# Patient Record
Sex: MALE | Race: WHITE | NOT HISPANIC OR LATINO | Employment: OTHER | URBAN - NONMETROPOLITAN AREA
[De-identification: names, ages, dates, MRNs, and addresses within clinical notes are randomized per-mention and may not be internally consistent; named-entity substitution may affect disease eponyms.]

---

## 2017-01-09 DIAGNOSIS — R73.9 HYPERGLYCEMIA: Primary | ICD-10-CM

## 2017-01-10 ENCOUNTER — TELEPHONE (OUTPATIENT)
Dept: INTERNAL MEDICINE | Facility: CLINIC | Age: 77
End: 2017-01-10

## 2017-01-10 NOTE — TELEPHONE ENCOUNTER
SPOKE WITH PT LET HIM KNOW LAB SHOWS NOT DIABETIC BUT IN PREDIABETIC RANGE DR MAYORGA RECOMMENDS HE CUT CARBS IN DIET

## 2017-04-23 ENCOUNTER — APPOINTMENT (OUTPATIENT)
Dept: GENERAL RADIOLOGY | Facility: HOSPITAL | Age: 77
End: 2017-04-23

## 2017-04-23 ENCOUNTER — HOSPITAL ENCOUNTER (EMERGENCY)
Facility: HOSPITAL | Age: 77
Discharge: HOME | End: 2017-04-23
Attending: EMERGENCY MEDICINE | Admitting: EMERGENCY MEDICINE

## 2017-04-23 VITALS
WEIGHT: 240 LBS | DIASTOLIC BLOOD PRESSURE: 71 MMHG | OXYGEN SATURATION: 92 % | HEIGHT: 70 IN | TEMPERATURE: 97.7 F | SYSTOLIC BLOOD PRESSURE: 133 MMHG | HEART RATE: 68 BPM | BODY MASS INDEX: 34.36 KG/M2 | RESPIRATION RATE: 18 BRPM

## 2017-04-23 DIAGNOSIS — S61.219A FINGER LACERATION, INITIAL ENCOUNTER: ICD-10-CM

## 2017-04-23 DIAGNOSIS — S62.631B OPEN DISPLACED FRACTURE OF DISTAL PHALANX OF LEFT INDEX FINGER, INITIAL ENCOUNTER: Primary | ICD-10-CM

## 2017-04-23 PROCEDURE — 90471 IMMUNIZATION ADMIN: CPT | Performed by: EMERGENCY MEDICINE

## 2017-04-23 PROCEDURE — 73130 X-RAY EXAM OF HAND: CPT

## 2017-04-23 PROCEDURE — 90715 TDAP VACCINE 7 YRS/> IM: CPT | Performed by: EMERGENCY MEDICINE

## 2017-04-23 PROCEDURE — 99283 EMERGENCY DEPT VISIT LOW MDM: CPT

## 2017-04-23 PROCEDURE — 25010000002 TDAP 5-2.5-18.5 LF-MCG/0.5 SUSPENSION: Performed by: EMERGENCY MEDICINE

## 2017-04-23 RX ORDER — OXYCODONE AND ACETAMINOPHEN 7.5; 325 MG/1; MG/1
1 TABLET ORAL ONCE
Status: COMPLETED | OUTPATIENT
Start: 2017-04-23 | End: 2017-04-23

## 2017-04-23 RX ORDER — CEPHALEXIN 500 MG/1
500 CAPSULE ORAL ONCE
Status: COMPLETED | OUTPATIENT
Start: 2017-04-23 | End: 2017-04-23

## 2017-04-23 RX ORDER — HYDROCODONE BITARTRATE AND ACETAMINOPHEN 5; 325 MG/1; MG/1
1 TABLET ORAL EVERY 6 HOURS PRN
Qty: 10 TABLET | Refills: 0 | Status: SHIPPED | OUTPATIENT
Start: 2017-04-23 | End: 2017-08-14

## 2017-04-23 RX ORDER — CEPHALEXIN 500 MG/1
500 CAPSULE ORAL 4 TIMES DAILY
Qty: 28 CAPSULE | Refills: 0 | Status: SHIPPED | OUTPATIENT
Start: 2017-04-23 | End: 2017-04-30

## 2017-04-23 RX ORDER — DIAPER,BRIEF,INFANT-TODD,DISP
EACH MISCELLANEOUS ONCE
Status: COMPLETED | OUTPATIENT
Start: 2017-04-23 | End: 2017-04-23

## 2017-04-23 RX ADMIN — CEPHALEXIN 500 MG: 500 CAPSULE ORAL at 18:00

## 2017-04-23 RX ADMIN — OXYCODONE HYDROCHLORIDE AND ACETAMINOPHEN 1 TABLET: 7.5; 325 TABLET ORAL at 15:50

## 2017-04-23 RX ADMIN — BACITRACIN ZINC: 500 OINTMENT TOPICAL at 18:00

## 2017-04-23 RX ADMIN — TETANUS TOXOID, REDUCED DIPHTHERIA TOXOID AND ACELLULAR PERTUSSIS VACCINE, ADSORBED 0.5 ML: 5; 2.5; 8; 8; 2.5 SUSPENSION INTRAMUSCULAR at 15:51

## 2017-04-23 NOTE — ED PROVIDER NOTES
Subjective   Patient is a 76 y.o. male presenting with upper extremity pain.   History provided by:  Patient  Upper Extremity Issue   Location:  Finger  Finger location:  L index finger  Injury: yes    Time since incident:  2 hours  Mechanism of injury comment:  While cutting fluids on a table saw a piece of f hit against the left index finger nail area causing a laceration.  Pain details:     Quality:  Sharp    Radiates to:  Does not radiate    Severity:  Moderate    Onset quality:  Sudden    Timing:  Constant    Progression:  Unchanged  Handedness:  Left-handed  Dislocation: no    Tetanus status:  Unknown  Prior injury to area:  No  Relieved by:  Nothing  Worsened by:  Nothing  Associated symptoms: no fever, no swelling and no tingling        Review of Systems   Constitutional: Negative for fever.   HENT: Negative for congestion and facial swelling.    Eyes: Negative for photophobia and visual disturbance.   Respiratory: Negative for cough, shortness of breath and wheezing.    Cardiovascular: Negative for chest pain.   Gastrointestinal: Negative for abdominal pain, diarrhea, nausea and vomiting.   Endocrine: Negative for polyuria.   Genitourinary: Negative for flank pain.   Musculoskeletal: Negative for joint swelling.   Skin: Negative for rash.   Neurological: Negative for seizures and headaches.   Hematological: Negative for adenopathy.   Psychiatric/Behavioral: Negative for agitation.       Past Medical History:   Diagnosis Date   • Backache    • Benign prostatic hyperplasia    • Chest pain    • Chronic low back pain    • Diverticular disease of colon    • History of colon polyps    • Hyperglycemia    • Hyperlipidemia    • Multiple joint pain    • Obesity        No Known Allergies    Past Surgical History:   Procedure Laterality Date   • APPENDECTOMY  05/31/1974    Acute appendicitis   • BACK SURGERY      Low back disk surgery (1)    • ENDOSCOPY AND COLONOSCOPY  09/10/2013    Diverticulosis in sigmoid colon. 1  polyp in colon, 50 cm from entry; removed by snare cautery polypectomy. 1 polyp in cecum; removed by cold biopsy polypectomy. Hemorrhoids found.   • EXCISION LESION  08/09/2012    Destruction of Benign Lesion (1-14) 29524 (1)     • INJECTION OF MEDICATION  05/17/2012    Kenalog (1)     • OTHER SURGICAL HISTORY  01/31/2003    Revision of penis (1) : Phalloplasty.Phimosis   • TONSILLECTOMY      age 26       Family History   Problem Relation Age of Onset   • Heart disease Mother    • Coronary artery disease Other    • Osteoarthritis Other        Social History     Social History   • Marital status: Single     Spouse name: N/A   • Number of children: N/A   • Years of education: N/A     Social History Main Topics   • Smoking status: Former Smoker   • Smokeless tobacco: None      Comment: Patient quit smoking years ago; he smoked for up to 2 packs a day for many years   • Alcohol use Yes      Comment: SOCIAL   • Drug use: No   • Sexual activity: Defer     Other Topics Concern   • None     Social History Narrative           Objective   Physical Exam   Constitutional: He is oriented to person, place, and time. He appears well-developed and well-nourished.   HENT:   Head: Normocephalic and atraumatic.   Eyes: EOM are normal.   Neck: Normal range of motion. Neck supple.   Cardiovascular: Normal rate, regular rhythm and normal heart sounds.    Pulmonary/Chest: Effort normal and breath sounds normal. No respiratory distress. He has no wheezes.   Musculoskeletal: He exhibits tenderness and deformity.   Left index finger laceration from middle nail lateral aspect of the finger.  No exposed bone.  Flexion/extension at DIP intact but painful.   Neurological: He is alert and oriented to person, place, and time.   Nursing note and vitals reviewed.      Laceration Repair  Date/Time: 4/23/2017 5:22 PM  Performed by: HAYDEE REESE  Authorized by: HAYDEE REESE   Consent: Verbal consent obtained.  Risks and benefits: risks, benefits and  "alternatives were discussed  Consent given by: patient  Patient understanding: patient states understanding of the procedure being performed  Patient consent: the patient's understanding of the procedure matches consent given  Procedure consent: procedure consent matches procedure scheduled  Relevant documents: relevant documents present and verified  Test results: test results available and properly labeled  Site marked: the operative site was marked  Imaging studies: imaging studies available  Required items: required blood products, implants, devices, and special equipment available  Patient identity confirmed: verbally with patient  Time out: Immediately prior to procedure a \"time out\" was called to verify the correct patient, procedure, equipment, support staff and site/side marked as required.  Body area: upper extremity  Location details: left index finger  Laceration length: 2.5 cm  Foreign bodies: unknown  Tendon involvement: none  Vascular damage: no  Anesthesia: digital block    Anesthesia:  Anesthesia: digital block  Local Anesthetic: lidocaine 1% without epinephrine   Anesthetic total: 5 mL  Sedation:  Patient sedated: no    Preparation: Patient was prepped and draped in the usual sterile fashion.  Irrigation solution: saline  Irrigation method: jet lavage  Amount of cleaning: extensive  Debridement: none  Degree of undermining: none  Skin closure: 4-0 nylon  Number of sutures: 5  Technique: simple  Approximation: close  Approximation difficulty: complex  Dressing: antibiotic ointment  Patient tolerance: Patient tolerated the procedure well with no immediate complications               ED Course  ED Course   Comment By Time   Laceration is repaired.  Tetanus is updated.  Advised follow-up with Dr. Sue/Dr. Huff.  Continue with Keflex. Oj Alonso MD 04/23 4338              Labs Reviewed - No data to display    Xr Hand 3+ View Left    Result Date: 4/23/2017  Narrative: EXAM:  Radiograph(s), Osseous "     REGION:   Hand SIDE:     Left VIEWS:   3      INDICATION:    Table saw injury to the index finger  COMPARISON:    none   FINDINGS:   There is traumatic fracture of the distal tuft of the index finger with associated soft tissue injury. The remainder the examination is unremarkable.     Impression: CONCLUSION: 1. Traumatic fracture and soft tissue injury associated with the distal aspect of the index finger.         Electronically signed by:  MIGUE Chew MD  4/23/2017 4:42 PM CDT Workstation: [id number]          [facility]    Final diagnoses:   Open displaced fracture of distal phalanx of left index finger, initial encounter   Finger laceration, initial encounter            Oj Alonso MD  04/24/17 [id number]

## 2017-04-23 NOTE — ED NOTES
Pt presents to er with c/o finger laceration from using a table saw. Pt reports that his tetanus is not up to date     Charo Mckeon RN  04/23/17 [id number]

## 2017-04-24 ENCOUNTER — OFFICE VISIT (OUTPATIENT)
Dept: ORTHOPEDIC SURGERY | Facility: CLINIC | Age: 77
End: 2017-04-24

## 2017-04-24 VITALS — HEIGHT: 70 IN | WEIGHT: 243 LBS | BODY MASS INDEX: 34.79 KG/M2

## 2017-04-24 DIAGNOSIS — M79.642 LEFT HAND PAIN: ICD-10-CM

## 2017-04-24 DIAGNOSIS — S62.609B: ICD-10-CM

## 2017-04-24 DIAGNOSIS — S61.211A LACERATION OF LEFT INDEX FINGER: Primary | ICD-10-CM

## 2017-04-24 PROCEDURE — 99202 OFFICE O/P NEW SF 15 MIN: CPT | Performed by: ORTHOPAEDIC SURGERY

## 2017-04-24 NOTE — PROGRESS NOTES
Timothy Garcia is a 76 y.o. male   Primary provider:  Cris Arreola MD       Chief Complaint   Patient presents with   • Left Hand - Pain       HISTORY OF PRESENT ILLNESS:    HPI Comments: cutting wood on a table saw a piece flew off and hit against the left index finger nail area causing a laceration. Patient was seen in the ER on 4/23/2017. Laceration repair done in the er.     Pain   This is a new problem. The current episode started yesterday. The problem occurs constantly. The problem has been unchanged. Associated symptoms include neck pain. Associated symptoms comments: Dull ache. Nothing aggravates the symptoms. He has tried oral narcotics for the symptoms.      Basically this is a fingertip injury wounds have been closed no neurovascular deficits  CONCURRENT MEDICAL HISTORY:    Past Medical History:   Diagnosis Date   • Backache    • Benign prostatic hyperplasia    • Chest pain    • Chronic low back pain    • Diverticular disease of colon    • History of colon polyps    • Hyperglycemia    • Hyperlipidemia    • Multiple joint pain    • Obesity        No Known Allergies      Current Outpatient Prescriptions:   •  albuterol (PROVENTIL HFA;VENTOLIN HFA) 108 (90 BASE) MCG/ACT inhaler, Inhale 2 puffs Every 4 (Four) Hours As Needed for wheezing., Disp: 1 inhaler, Rfl: 1  •  aspirin 81 MG chewable tablet, Chew 81 mg Daily., Disp: , Rfl:   •  atorvastatin (LIPITOR) 10 MG tablet, TAKE ONE TABLET EACH DAY., Disp: 90 tablet, Rfl: 3  •  cephalexin (KEFLEX) 500 MG capsule, Take 1 capsule by mouth 4 (Four) Times a Day for 7 days., Disp: 28 capsule, Rfl: 0  •  doxycycline (DORYX) 100 MG enteric coated tablet, Take 1 tablet by mouth 2 (Two) Times a Day., Disp: 14 tablet, Rfl: 0  •  esomeprazole (nexIUM) 20 MG capsule, TAKE ONE CAPSULE BY MOUTH DAILY, Disp: 90 capsule, Rfl: 0  •  HYDROcodone-acetaminophen (NORCO) 5-325 MG per tablet, Take 1 tablet by mouth Every 6 (Six) Hours As Needed for Moderate Pain (4-6) for up to  15 doses., Disp: 10 tablet, Rfl: 0  •  HYDROcodone-acetaminophen (NORCO) 7.5-325 MG per tablet, Take 1 tablet by mouth 4 (Four) Times a Day., Disp: , Rfl:   •  tamsulosin (FLOMAX) 0.4 MG capsule 24 hr capsule, TAKE 1 CAPSULE DAILY., Disp: 90 capsule, Rfl: 3  •  MethylPREDNISolone (MEDROL, KAPIL,) 4 MG tablet, Take as directed on package instructions., Disp: 21 tablet, Rfl: 0  •  nabumetone (RELAFEN) 750 MG tablet, Take 1 tablet by mouth 2 (Two) Times a Day As Needed for mild pain (1-3)., Disp: 30 tablet, Rfl: 1  No current facility-administered medications for this visit.     Past Surgical History:   Procedure Laterality Date   • APPENDECTOMY  05/31/1974    Acute appendicitis   • BACK SURGERY      Low back disk surgery (1)    • ENDOSCOPY AND COLONOSCOPY  09/10/2013    Diverticulosis in sigmoid colon. 1 polyp in colon, 50 cm from entry; removed by snare cautery polypectomy. 1 polyp in cecum; removed by cold biopsy polypectomy. Hemorrhoids found.   • EXCISION LESION  08/09/2012    Destruction of Benign Lesion (1-14) 53119 (1)     • INJECTION OF MEDICATION  05/17/2012    Kenalog (1)     • OTHER SURGICAL HISTORY  01/31/2003    Revision of penis (1) : Phalloplasty.Phimosis   • TONSILLECTOMY      age 26       Family History   Problem Relation Age of Onset   • Heart disease Mother    • Coronary artery disease Other    • Osteoarthritis Other         Social History     Social History   • Marital status: Single     Spouse name: N/A   • Number of children: N/A   • Years of education: N/A     Occupational History   • Not on file.     Social History Main Topics   • Smoking status: Former Smoker   • Smokeless tobacco: Not on file      Comment: Patient quit smoking years ago; he smoked for up to 2 packs a day for many years   • Alcohol use Yes      Comment: SOCIAL   • Drug use: No   • Sexual activity: Defer     Other Topics Concern   • Not on file     Social History Narrative        Review of Systems   Respiratory: Positive for  "shortness of breath and wheezing.    Musculoskeletal: Positive for back pain and neck pain.   Psychiatric/Behavioral: Positive for sleep disturbance.   All other systems reviewed and are negative.      PHYSICAL EXAMINATION:       Ht 70\" (177.8 cm)  Wt 243 lb (110 kg)  BMI 34.87 kg/m2    Physical Exam    GAIT:     []  Normal  []  Antalgic    Assistive device: []  None  []  Walker     []  Crutches  []  Cane     []  Wheelchair  []  Stretcher    Ortho Exam dressing was changed wounds are healing well no infection      Xr Hand 3+ View Left    Result Date: 4/23/2017  Narrative: EXAM:  Radiograph(s), Osseous     REGION:   Hand SIDE:     Left VIEWS:   3      INDICATION:    Table saw injury to the index finger  COMPARISON:    none   FINDINGS:   There is traumatic fracture of the distal tuft of the index finger with associated soft tissue injury. The remainder the examination is unremarkable.     Impression: CONCLUSION: 1. Traumatic fracture and soft tissue injury associated with the distal aspect of the index finger.         Electronically signed by:  MIGUE Chew MD  4/23/2017 4:42 PM CDT Workstation: [id number]          ASSESSMENT:    Diagnoses and all orders for this visit:    Laceration of left index finger    Left hand pain          PLAN    No Follow-up on file.    Emil Sue MD    "

## 2017-05-08 ENCOUNTER — OFFICE VISIT (OUTPATIENT)
Dept: ORTHOPEDIC SURGERY | Facility: CLINIC | Age: 77
End: 2017-05-08

## 2017-05-08 VITALS — HEIGHT: 70 IN | WEIGHT: 243 LBS | BODY MASS INDEX: 34.79 KG/M2

## 2017-05-08 DIAGNOSIS — S61.211A LACERATION OF LEFT INDEX FINGER: Primary | ICD-10-CM

## 2017-05-08 DIAGNOSIS — S69.92XD: ICD-10-CM

## 2017-05-08 PROBLEM — S69.90XA INJURY OF NAIL BED OF FINGER: Status: ACTIVE | Noted: 2017-05-08

## 2017-05-08 PROCEDURE — 99212 OFFICE O/P EST SF 10 MIN: CPT | Performed by: ORTHOPAEDIC SURGERY

## 2017-06-27 RX ORDER — NABUMETONE 750 MG/1
TABLET, FILM COATED ORAL
Qty: 30 TABLET | Refills: 0 | Status: SHIPPED | OUTPATIENT
Start: 2017-06-27 | End: 2017-08-14 | Stop reason: SDUPTHER

## 2017-07-28 ENCOUNTER — TELEPHONE (OUTPATIENT)
Dept: INTERNAL MEDICINE | Facility: CLINIC | Age: 77
End: 2017-07-28

## 2017-08-14 ENCOUNTER — OFFICE VISIT (OUTPATIENT)
Dept: INTERNAL MEDICINE | Facility: CLINIC | Age: 77
End: 2017-08-14

## 2017-08-14 ENCOUNTER — APPOINTMENT (OUTPATIENT)
Dept: LAB | Facility: HOSPITAL | Age: 77
End: 2017-08-14

## 2017-08-14 VITALS
HEIGHT: 70 IN | DIASTOLIC BLOOD PRESSURE: 64 MMHG | BODY MASS INDEX: 34.96 KG/M2 | WEIGHT: 244.2 LBS | SYSTOLIC BLOOD PRESSURE: 120 MMHG

## 2017-08-14 DIAGNOSIS — N40.0 BPH WITHOUT URINARY OBSTRUCTION: ICD-10-CM

## 2017-08-14 DIAGNOSIS — E78.2 MIXED HYPERLIPIDEMIA: ICD-10-CM

## 2017-08-14 DIAGNOSIS — M25.50 ARTHRALGIA OF MULTIPLE SITES, BILATERAL: Primary | ICD-10-CM

## 2017-08-14 DIAGNOSIS — Z79.1 ENCOUNTER FOR LONG-TERM (CURRENT) USE OF NSAIDS: ICD-10-CM

## 2017-08-14 LAB
ANION GAP SERPL CALCULATED.3IONS-SCNC: 9 MMOL/L (ref 5–15)
BUN BLD-MCNC: 18 MG/DL (ref 7–21)
BUN/CREAT SERPL: 23.4 (ref 7–25)
CALCIUM SPEC-SCNC: 9 MG/DL (ref 8.4–10.2)
CHLORIDE SERPL-SCNC: 101 MMOL/L (ref 95–110)
CO2 SERPL-SCNC: 26 MMOL/L (ref 22–31)
CREAT BLD-MCNC: 0.77 MG/DL (ref 0.7–1.3)
GFR SERPL CREATININE-BSD FRML MDRD: 98 ML/MIN/1.73
GLUCOSE BLD-MCNC: 94 MG/DL (ref 60–100)
POTASSIUM BLD-SCNC: 4.2 MMOL/L (ref 3.5–5.1)
SODIUM BLD-SCNC: 136 MMOL/L (ref 137–145)

## 2017-08-14 PROCEDURE — 99213 OFFICE O/P EST LOW 20 MIN: CPT | Performed by: INTERNAL MEDICINE

## 2017-08-14 PROCEDURE — 80048 BASIC METABOLIC PNL TOTAL CA: CPT | Performed by: INTERNAL MEDICINE

## 2017-08-14 PROCEDURE — 36415 COLL VENOUS BLD VENIPUNCTURE: CPT | Performed by: INTERNAL MEDICINE

## 2017-08-14 RX ORDER — NABUMETONE 750 MG/1
750 TABLET, FILM COATED ORAL 2 TIMES DAILY PRN
Qty: 60 TABLET | Refills: 5 | Status: SHIPPED | OUTPATIENT
Start: 2017-08-14 | End: 2018-02-26 | Stop reason: SDUPTHER

## 2017-08-14 RX ORDER — HYDROCODONE BITARTRATE AND ACETAMINOPHEN 10; 300 MG/1; MG/1
1 TABLET ORAL 4 TIMES DAILY
COMMUNITY

## 2017-08-14 RX ORDER — ATORVASTATIN CALCIUM 10 MG/1
10 TABLET, FILM COATED ORAL DAILY
Qty: 30 TABLET | Refills: 5 | Status: SHIPPED | OUTPATIENT
Start: 2017-08-14 | End: 2018-02-26 | Stop reason: SDUPTHER

## 2017-08-14 RX ORDER — ALBUTEROL SULFATE 90 UG/1
2 AEROSOL, METERED RESPIRATORY (INHALATION) EVERY 4 HOURS PRN
Qty: 1 INHALER | Refills: 1 | Status: SHIPPED | OUTPATIENT
Start: 2017-08-14

## 2017-08-14 NOTE — PROGRESS NOTES
Subjective   Timothy Garcia is a 76 y.o. male     Patient is in for recheck and meds refill.    DJD of multiple joints.   Relafen is helping with pain and stiffness in the joints.  Also takes Hydrocodone for low back pain. This is prescribed in Pain [facility].    Hyperlipidemia, on Lipitor.  Tolerates med well.  No myalgia, myositis or fatigue.  No history of elevated LFTs.  Lipids. , TG 66, HDL 36, .    Symptoms of BPH are better on Flomax.  Denies dysuria, hematuria or incontinence.  Past Medical History:   Diagnosis Date   • Backache    • Benign prostatic hyperplasia    • Chest pain    • Chronic low back pain    • Diverticular disease of colon    • History of colon polyps    • Hyperglycemia    • Hyperlipidemia    • Multiple joint pain    • Obesity      Past Surgical History:   Procedure Laterality Date   • APPENDECTOMY  05/31/1974    Acute appendicitis   • BACK SURGERY      Low back disk surgery (1)    • ENDOSCOPY AND COLONOSCOPY  09/10/2013    Diverticulosis in sigmoid colon. 1 polyp in colon, 50 cm from entry; removed by snare cautery polypectomy. 1 polyp in cecum; removed by cold biopsy polypectomy. Hemorrhoids found.   • EXCISION LESION  08/09/2012    Destruction of Benign Lesion (1-14) 14568 (1)     • INJECTION OF MEDICATION  05/17/2012    Kenalog (1)     • OTHER SURGICAL HISTORY  01/31/2003    Revision of penis (1) : Phalloplasty.Phimosis   • TONSILLECTOMY      age 26       Social History   Substance Use Topics   • Smoking status: Former Smoker   • Smokeless tobacco: Never Used      Comment: Patient quit smoking years ago; he smoked for up to 2 packs a day for many years   • Alcohol use Yes      Comment: SOCIAL     Family History   Problem Relation Age of Onset   • Heart disease Mother    • Coronary artery disease Other    • Osteoarthritis Other      No Known Allergies  Current Outpatient Prescriptions on File Prior to Visit   Medication Sig Dispense Refill   • albuterol (PROVENTIL HFA;VENTOLIN  HFA) 108 (90 BASE) MCG/ACT inhaler Inhale 2 puffs Every 4 (Four) Hours As Needed for wheezing. 1 inhaler 1   • aspirin 81 MG chewable tablet Chew 81 mg Daily.     • atorvastatin (LIPITOR) 10 MG tablet TAKE ONE TABLET EACH DAY. 90 tablet 3   • esomeprazole (nexIUM) 20 MG capsule TAKE ONE CAPSULE BY MOUTH DAILY 14 capsule 0   • nabumetone (RELAFEN) 750 MG tablet TAKE 1 TABLET BY MOUTH 2 (TWO) TIMES A DAY AS NEEDED FOR MILD PAIN (1-3). 30 tablet 0   • tamsulosin (FLOMAX) 0.4 MG capsule 24 hr capsule TAKE 1 CAPSULE DAILY. 90 capsule 3   • [DISCONTINUED] doxycycline (DORYX) 100 MG enteric coated tablet Take 1 tablet by mouth 2 (Two) Times a Day. 14 tablet 0   • [DISCONTINUED] HYDROcodone-acetaminophen (NORCO) 5-325 MG per tablet Take 1 tablet by mouth Every 6 (Six) Hours As Needed for Moderate Pain (4-6) for up to 15 doses. 10 tablet 0   • [DISCONTINUED] HYDROcodone-acetaminophen (NORCO) 7.5-325 MG per tablet Take 1 tablet by mouth 4 (Four) Times a Day.     • [DISCONTINUED] MethylPREDNISolone (MEDROL, KAPIL,) 4 MG tablet Take as directed on package instructions. 21 tablet 0     No current facility-administered medications on file prior to visit.      Review of Systems   Constitutional: Negative for activity change and fatigue.   Respiratory: Negative for chest tightness and shortness of breath.    Cardiovascular: Negative for chest pain, palpitations and leg swelling.   Gastrointestinal: Negative for abdominal pain, constipation and diarrhea.   Genitourinary: Negative for dysuria, flank pain and hematuria.   Musculoskeletal: Positive for arthralgias and back pain. Negative for joint swelling and myalgias.   Skin: Negative for rash.   Neurological: Negative for dizziness, light-headedness and numbness.       Objective   Physical Exam   Constitutional: He is oriented to person, place, and time. He appears well-developed and well-nourished.   HENT:   Head: Normocephalic and atraumatic.   Nose: Nose normal.   Mouth/Throat:  Oropharynx is clear and moist.   Eyes: Conjunctivae are normal. Left eye exhibits no discharge.   Neck: Neck supple. No JVD present.   Cardiovascular: Normal rate and regular rhythm.    No murmur heard.  Pulmonary/Chest: Effort normal.   Abdominal: Soft. Bowel sounds are normal. He exhibits no mass.   Musculoskeletal: He exhibits no deformity.   Neurological: He is alert and oriented to person, place, and time.   Skin: Skin is warm and dry. No rash noted.   Psychiatric: He has a normal mood and affect. His behavior is normal.   Nursing note and vitals reviewed.          Patient Active Problem List   Diagnosis   • Upper respiratory tract infection   • Acute wheezy bronchitis   • Hyperlipidemia   • Arthralgia of multiple sites, bilateral   • Laceration of left index finger   • Left hand pain   • Fracture of phalanx of right hand, open   • Injury of nail bed of finger     Office Visit on 08/14/2017   Component Date Value Ref Range Status   • Glucose 08/14/2017 94  60 - 100 mg/dL Final   • BUN 08/14/2017 18  7 - 21 mg/dL Final   • Creatinine 08/14/2017 0.77  0.70 - 1.30 mg/dL Final   • Sodium 08/14/2017 136* 137 - 145 mmol/L Final   • Potassium 08/14/2017 4.2  3.5 - 5.1 mmol/L Final   • Chloride 08/14/2017 101  95 - 110 mmol/L Final   • CO2 08/14/2017 26.0  22.0 - 31.0 mmol/L Final   • Calcium 08/14/2017 9.0  8.4 - 10.2 mg/dL Final   • eGFR Non  Amer 08/14/2017 98  >60 mL/min/1.73 Final   • BUN/Creatinine Ratio 08/14/2017 23.4  7.0 - 25.0 Final   • Anion Gap 08/14/2017 9.0  5.0 - 15.0 mmol/L Final               Assessment    Diagnosis Plan   1. Arthralgia of multiple sites, bilateral     2. Mixed hyperlipidemia     3. BPH without urinary obstruction     4. Encounter for long-term (current) use of NSAIDs  Basic Metabolic Panel       Plan      1. Patient will continue Relafen.      Nexium for GI protection.      Side effects of medications discussed with the patient.      Will recheck renal panel.  2. Lipitor is  continued.      Counseled on nutrition.      Advised to lose weight.      His physical activity is limited by arthritis in his back.  3. Flomax is continued.        Follow up in 6 months.

## 2017-08-15 ENCOUNTER — TELEPHONE (OUTPATIENT)
Dept: INTERNAL MEDICINE | Facility: CLINIC | Age: 77
End: 2017-08-15

## 2017-08-28 ENCOUNTER — TELEPHONE (OUTPATIENT)
Dept: INTERNAL MEDICINE | Facility: CLINIC | Age: 77
End: 2017-08-28

## 2017-08-28 RX ORDER — TAMSULOSIN HYDROCHLORIDE 0.4 MG/1
0.4 CAPSULE ORAL 2 TIMES DAILY
Qty: 60 CAPSULE | Refills: 3 | Status: SHIPPED | OUTPATIENT
Start: 2017-08-28 | End: 2018-05-14 | Stop reason: SDUPTHER

## 2017-08-28 NOTE — TELEPHONE ENCOUNTER
[facility] called on patients prescription for flomax. They have a prescription for once a day but the patient said that Dr. Arreola had increase him to 2 times a day so they are needing clarification.

## 2018-02-26 RX ORDER — NABUMETONE 750 MG/1
TABLET, FILM COATED ORAL
Qty: 60 TABLET | Refills: 0 | Status: SHIPPED | OUTPATIENT
Start: 2018-02-26 | End: 2018-07-12

## 2018-02-26 RX ORDER — ATORVASTATIN CALCIUM 10 MG/1
TABLET, FILM COATED ORAL
Qty: 30 TABLET | Refills: 0 | Status: SHIPPED | OUTPATIENT
Start: 2018-02-26 | End: 2018-07-12

## 2018-05-14 RX ORDER — TAMSULOSIN HYDROCHLORIDE 0.4 MG/1
CAPSULE ORAL
Qty: 60 CAPSULE | Refills: 0 | Status: SHIPPED | OUTPATIENT
Start: 2018-05-14 | End: 2018-07-12

## 2018-07-12 RX ORDER — TAMSULOSIN HYDROCHLORIDE 0.4 MG/1
1 CAPSULE ORAL 2 TIMES DAILY
COMMUNITY

## 2018-07-12 RX ORDER — ATORVASTATIN CALCIUM 10 MG/1
10 TABLET, FILM COATED ORAL DAILY
COMMUNITY

## 2018-07-12 RX ORDER — NABUMETONE 750 MG/1
750 TABLET, FILM COATED ORAL 2 TIMES DAILY
COMMUNITY

## 2018-07-19 ENCOUNTER — ANESTHESIA (OUTPATIENT)
Dept: GASTROENTEROLOGY | Facility: HOSPITAL | Age: 78
End: 2018-07-19

## 2018-07-19 ENCOUNTER — ANESTHESIA EVENT (OUTPATIENT)
Dept: GASTROENTEROLOGY | Facility: HOSPITAL | Age: 78
End: 2018-07-19

## 2018-07-19 ENCOUNTER — HOSPITAL ENCOUNTER (OUTPATIENT)
Facility: HOSPITAL | Age: 78
Setting detail: HOSPITAL OUTPATIENT SURGERY
Discharge: HOME | End: 2018-07-19
Attending: INTERNAL MEDICINE | Admitting: INTERNAL MEDICINE

## 2018-07-19 VITALS
HEIGHT: 71 IN | HEART RATE: 63 BPM | SYSTOLIC BLOOD PRESSURE: 105 MMHG | RESPIRATION RATE: 19 BRPM | BODY MASS INDEX: 33.04 KG/M2 | WEIGHT: 236 LBS | OXYGEN SATURATION: 93 % | TEMPERATURE: 97.6 F | DIASTOLIC BLOOD PRESSURE: 69 MMHG

## 2018-07-19 DIAGNOSIS — Z86.010 HISTORY OF COLONIC POLYPS: ICD-10-CM

## 2018-07-19 PROCEDURE — 88305 TISSUE EXAM BY PATHOLOGIST: CPT | Performed by: INTERNAL MEDICINE

## 2018-07-19 PROCEDURE — 88305 TISSUE EXAM BY PATHOLOGIST: CPT | Performed by: PATHOLOGY

## 2018-07-19 PROCEDURE — 25010000002 PROPOFOL 10 MG/ML EMULSION: Performed by: NURSE ANESTHETIST, CERTIFIED REGISTERED

## 2018-07-19 RX ORDER — PROPOFOL 10 MG/ML
VIAL (ML) INTRAVENOUS AS NEEDED
Status: DISCONTINUED | OUTPATIENT
Start: 2018-07-19 | End: 2018-07-19 | Stop reason: SURG

## 2018-07-19 RX ORDER — LIDOCAINE HYDROCHLORIDE 10 MG/ML
INJECTION, SOLUTION INFILTRATION; PERINEURAL AS NEEDED
Status: DISCONTINUED | OUTPATIENT
Start: 2018-07-19 | End: 2018-07-19 | Stop reason: SURG

## 2018-07-19 RX ORDER — DEXTROSE AND SODIUM CHLORIDE 5; .45 G/100ML; G/100ML
20 INJECTION, SOLUTION INTRAVENOUS CONTINUOUS
Status: DISCONTINUED | OUTPATIENT
Start: 2018-07-19 | End: 2018-07-19 | Stop reason: HOSPADM

## 2018-07-19 RX ADMIN — PROPOFOL 100 MG: 10 INJECTION, EMULSION INTRAVENOUS at 09:25

## 2018-07-19 RX ADMIN — PROPOFOL 40 MG: 10 INJECTION, EMULSION INTRAVENOUS at 09:43

## 2018-07-19 RX ADMIN — PROPOFOL 50 MG: 10 INJECTION, EMULSION INTRAVENOUS at 09:47

## 2018-07-19 RX ADMIN — PROPOFOL 30 MG: 10 INJECTION, EMULSION INTRAVENOUS at 09:29

## 2018-07-19 RX ADMIN — LIDOCAINE HYDROCHLORIDE 50 MG: 10 INJECTION, SOLUTION INFILTRATION; PERINEURAL at 09:25

## 2018-07-19 RX ADMIN — PROPOFOL 30 MG: 10 INJECTION, EMULSION INTRAVENOUS at 09:35

## 2018-07-19 RX ADMIN — DEXTROSE AND SODIUM CHLORIDE 20 ML/HR: 5; 450 INJECTION, SOLUTION INTRAVENOUS at 08:24

## 2018-07-19 NOTE — H&P
Lindsay Pollard DO,[facility]  Gastroenterology  Hepatology  Endoscopy  Board Certified in Internal Medicine and gastroenterology  [address], [address]  [place]. [address]  - [phone]   F - [phone]     GASTROENTEROLOGY HISTORY AND PHYSICAL  NOTE   LINDSAY POLLARD DO.         SUBJECTIVE:   7/19/2018    Name: Timothy Garcia  DOD: 1940        Chief Complaint:     Subjective : History of colon polyps    Patient is 77 y.o. male presents with desire for elective colonoscopy.      ROS/HISTORY/ CURRENT MEDICATIONS/OBJECTIVE/VS/PE:   Review of Systems:   Review of Systems    History:     Past Medical History:   Diagnosis Date   • Backache    • Benign prostatic hyperplasia    • Chest pain    • Chronic low back pain    • Diverticular disease of colon    • History of colon polyps    • Hyperglycemia    • Hyperlipidemia    • Multiple joint pain    • Obesity      Past Surgical History:   Procedure Laterality Date   • APPENDECTOMY  05/31/1974    Acute appendicitis   • BACK SURGERY      Low back disk surgery (1)    • ENDOSCOPY AND COLONOSCOPY  09/10/2013    Diverticulosis in sigmoid colon. 1 polyp in colon, 50 cm from entry; removed by snare cautery polypectomy. 1 polyp in cecum; removed by cold biopsy polypectomy. Hemorrhoids found.   • EXCISION LESION  08/09/2012    Destruction of Benign Lesion (1-14) 14004 (1)     • INJECTION OF MEDICATION  05/17/2012    Kenalog (1)     • OTHER SURGICAL HISTORY  01/31/2003    Revision of penis (1) : Phalloplasty.Phimosis   • TONSILLECTOMY      age 26     Family History   Problem Relation Age of Onset   • Heart disease Mother    • Coronary artery disease Other    • Osteoarthritis Other      Social History   Substance Use Topics   • Smoking status: Former Smoker   • Smokeless tobacco: Never Used      Comment: Patient quit smoking years ago; he smoked for up to 2 packs a day for many years   • Alcohol use Yes      Comment: SOCIAL     Prescriptions Prior to Admission    Medication Sig Dispense Refill Last Dose   • albuterol (PROVENTIL HFA;VENTOLIN HFA) 108 (90 BASE) MCG/ACT inhaler Inhale 2 puffs Every 4 (Four) Hours As Needed for Wheezing. 1 inhaler 1 Past Week at Unknown time   • atorvastatin (LIPITOR) 10 MG tablet Take 10 mg by mouth Daily.   7/18/2018   • esomeprazole (nexIUM) 20 MG capsule Take 2 capsules by mouth Every Morning Before Breakfast. 30 capsule 5 7/18/2018 at Unknown time   • Hydrocodone-Acetaminophen (XODOL )  MG per tablet Take 1 tablet by mouth 4 (Four) Times a Day.   7/18/2018 at Unknown time   • nabumetone (RELAFEN) 750 MG tablet Take 750 mg by mouth 2 (Two) Times a Day.   7/18/2018 at Unknown time   • tamsulosin (FLOMAX) 0.4 MG capsule 24 hr capsule Take 1 capsule by mouth 2 (Two) Times a Day.   7/18/2018 at Unknown time   • aspirin 81 MG chewable tablet Chew 81 mg Daily.   7/15/2018     Allergies:  Patient has no known allergies.    I have reviewed the patients medical history, surgical history and family history in the available medical record system.     Current Medications:     Current Facility-Administered Medications   Medication Dose Route Frequency Provider Last Rate Last Dose   • dextrose 5 % and sodium chloride 0.45 % infusion  20 mL/hr Intravenous Continuous Serjio Mckeon, DO 20 mL/hr at 07/19/18 0824 20 mL/hr at 07/19/18 0824       Objective     Physical Exam:   Temp:  [96.7 °F (35.9 °C)] 96.7 °F (35.9 °C)  Heart Rate:  [74] 74  Resp:  [18] 18  BP: (145)/(73) 145/73    Physical Exam:  General Appearance:    Alert, cooperative, in no acute distress   Head:    Normocephalic, without obvious abnormality, atraumatic   Eyes:            Lids and lashes normal, conjunctivae and sclerae normal, no   icterus, no pallor, corneas clear, PERRLA   Ears:    Ears appear intact with no abnormalities noted   Throat:   No oral lesions, no thrush, oral mucosa moist   Neck:   No adenopathy, supple, trachea midline, no thyromegaly, no     carotid  bruit, no JVD   Back:     No kyphosis present, no scoliosis present, no skin lesions,       erythema or scars, no tenderness to percussion or                   palpation,   range of motion normal   Lungs:     Clear to auscultation,respirations regular, even and                   unlabored    Heart:    Regular rhythm and normal rate, normal S1 and S2, no            murmur, no gallop, no rub, no click   Breast Exam:    Deferred   Abdomen:     Normal bowel sounds, no masses, no organomegaly, soft        non-tender, non-distended, no guarding, no rebound                 tenderness   Genitalia:    Deferred   Extremities:   Moves all extremities well, no edema, no cyanosis, no              redness   Pulses:   Pulses palpable and equal bilaterally   Skin:   No bleeding, bruising or rash   Lymph nodes:   No palpable adenopathy   Neurologic:   Cranial nerves 2 - 12 grossly intact, sensation intact, DTR        present and equal bilaterally      Results Review:     Lab Results   Component Value Date    WBC 5.5 03/20/2015    HGB 15.9 03/20/2015    HCT 47.0 03/20/2015     03/20/2015             No results found for: LIPASE  No results found for: INR       Radiology Review:  Imaging Results (last 72 hours)     ** No results found for the last 72 hours. **           I reviewed the patient's new clinical results.  I reviewed the patient's new imaging results and agree with the interpretation.     ASSESSMENT/PLAN:   ASSESSMENT:   1.  Personal history of colon polyps    PLAN:   1.  Colonoscopy    Risk and benefits associated with the procedure are reviewed with the patient.  The patient wishes to proceed      Serjio Mckeon DO  07/19/18  9:14 AM

## 2018-07-19 NOTE — ANESTHESIA PREPROCEDURE EVALUATION
Anesthesia Evaluation     NPO Solid Status: > 8 hours  NPO Liquid Status: > 8 hours           Airway   Mallampati: II  No difficulty expected  Dental    (+) lower dentures and upper dentures    Pulmonary - normal exam   (+) recent URI,   Cardiovascular - normal exam    (+) hyperlipidemia,       Neuro/Psych  GI/Hepatic/Renal/Endo    (+) obesity,       Musculoskeletal     Abdominal    Substance History      OB/GYN          Other                      Anesthesia Plan    ASA 3     MAC     intravenous induction   Anesthetic plan and risks discussed with patient.

## 2018-07-19 NOTE — ANESTHESIA POSTPROCEDURE EVALUATION
Patient: Timothy Garcia    Procedure Summary     Date:  07/19/18 Room / Location:  [facility] ENDOSCOPY 2 / [facility] ENDOSCOPY    Anesthesia Start:  0922 Anesthesia Stop:  0948    Procedure:  COLONOSCOPY (N/A ) Diagnosis:       History of colonic polyps      (History of colonic polyps [Z86.010])    Surgeon:  Serjio Mckeon DO Provider:  Kvng Collins CRNA    Anesthesia Type:  MAC ASA Status:  3          Anesthesia Type: MAC  Last vitals  BP   145/73 (07/19/18 0808)   Temp   96.7 °F (35.9 °C) (07/19/18 0816)   Pulse   74 (07/19/18 0808)   Resp   18 (07/19/18 0808)     SpO2   96 % (07/19/18 0808)     Post Anesthesia Care and Evaluation    Patient location during evaluation: bedside  Patient participation: waiting for patient participation  Level of consciousness: responsive to verbal stimuli  Pain management: adequate  Airway patency: patent  Anesthetic complications: No anesthetic complications  PONV Status: none  Cardiovascular status: acceptable  Respiratory status: acceptable  Hydration status: acceptable

## 2018-07-20 LAB
LAB AP CASE REPORT: NORMAL
PATH REPORT.FINAL DX SPEC: NORMAL
PATH REPORT.GROSS SPEC: NORMAL

## 2022-05-26 NOTE — DISCHARGE INSTRUCTIONS
Follow-up with orthopedic surgery.  Return to ER for any increasing swelling/bluish discoloration of the fingertips/discharge/fever or chills.   Morbid obesity (BMI > 40)

## 2023-09-07 DIAGNOSIS — M25.561 RIGHT KNEE PAIN, UNSPECIFIED CHRONICITY: Primary | ICD-10-CM

## 2023-09-12 ENCOUNTER — OFFICE VISIT (OUTPATIENT)
Dept: ORTHOPEDIC SURGERY | Facility: CLINIC | Age: 83
End: 2023-09-12
Payer: MEDICARE

## 2023-09-12 VITALS — WEIGHT: 241.6 LBS | BODY MASS INDEX: 33.82 KG/M2 | HEIGHT: 71 IN

## 2023-09-12 DIAGNOSIS — M25.561 ACUTE PAIN OF RIGHT KNEE: ICD-10-CM

## 2023-09-12 DIAGNOSIS — M17.11 PRIMARY OSTEOARTHRITIS OF RIGHT KNEE: Primary | ICD-10-CM

## 2023-09-12 DIAGNOSIS — I10 ESSENTIAL HYPERTENSION: ICD-10-CM

## 2023-09-12 RX ORDER — OXYBUTYNIN CHLORIDE 5 MG/1
1 TABLET, EXTENDED RELEASE ORAL 3 TIMES DAILY PRN
COMMUNITY
Start: 2023-09-01

## 2023-09-12 RX ORDER — MULTIPLE VITAMINS W/ MINERALS TAB 9MG-400MCG
1 TAB ORAL DAILY
COMMUNITY

## 2023-09-12 RX ORDER — ERYTHROMYCIN 5 MG/G
OINTMENT OPHTHALMIC
COMMUNITY
Start: 2023-07-31

## 2023-09-12 RX ORDER — MELOXICAM 15 MG/1
TABLET ORAL
Qty: 30 TABLET | Refills: 3 | Status: SHIPPED | OUTPATIENT
Start: 2023-09-12

## 2023-09-12 RX ORDER — MEMANTINE HYDROCHLORIDE 14 MG/1
CAPSULE, EXTENDED RELEASE ORAL
COMMUNITY

## 2023-09-12 RX ORDER — FUROSEMIDE 20 MG/1
TABLET ORAL
COMMUNITY

## 2023-09-12 RX ADMIN — LIDOCAINE HYDROCHLORIDE 7 ML: 10 INJECTION, SOLUTION INFILTRATION; PERINEURAL at 16:27

## 2023-09-12 RX ADMIN — TRIAMCINOLONE ACETONIDE 40 MG: 40 INJECTION, SUSPENSION INTRA-ARTICULAR; INTRAMUSCULAR at 16:27

## 2023-09-12 NOTE — PROGRESS NOTES
"  Timothy Garcia is a 82 y.o. male   Primary provider:  Hazel Apple APRN       Chief Complaint   Patient presents with    Right Knee - Pain       HISTORY OF PRESENT ILLNESS: Patient is here today for right knee pain. He was sent to [facility] upon arrival.   Patient states he started having pain after a fall about 3 months ago.  Pain along the medial and lateral parts of knee.  Pain with weight bearing.  Intermittent swelling, intermittent sharp pains.  No fever or chills  No numbness or tingling.  Occasional sharp pains \"under the knee cap\"?  Intermittent swelling.        Pain  This is a new problem. The current episode started 1 to 4 weeks ago. The problem occurs intermittently. The problem has been waxing and waning. Associated symptoms include arthralgias and joint swelling. Associated symptoms comments: burning. The symptoms are aggravated by walking. He has tried NSAIDs, acetaminophen and rest for the symptoms. The treatment provided mild relief.      CONCURRENT MEDICAL HISTORY:    Past Medical History:   Diagnosis Date    Backache     Benign prostatic hyperplasia     Chest pain     Chronic low back pain     Diverticular disease of colon     History of colon polyps     Hyperglycemia     Hyperlipidemia     Multiple joint pain     Obesity        No Known Allergies      Current Outpatient Medications:     albuterol (PROVENTIL HFA;VENTOLIN HFA) 108 (90 BASE) MCG/ACT inhaler, Inhale 2 puffs Every 4 (Four) Hours As Needed for Wheezing., Disp: 1 inhaler, Rfl: 1    atorvastatin (LIPITOR) 10 MG tablet, Take 1 tablet by mouth Daily., Disp: , Rfl:     erythromycin (ROMYCIN) 5 MG/GM ophthalmic ointment, APPLY A THIN LAYER INTO BOTH EYES AT BEDTIME, Disp: , Rfl:     esomeprazole (nexIUM) 20 MG capsule, Take 2 capsules by mouth Every Morning Before Breakfast., Disp: 30 capsule, Rfl: 5    furosemide (LASIX) 20 MG tablet, Take 1 tablet every day by oral route., Disp: , Rfl:     memantine (NAMENDA XR) 14 MG capsule " sustained-release 24 hr extended release capsule, Take 2 capsules every day by oral route., Disp: , Rfl:     multivitamin with minerals (ONE-A-DAY MENS 50+ PO), Take 1 tablet by mouth Daily., Disp: , Rfl:     oxybutynin XL (DITROPAN-XL) 5 MG 24 hr tablet, Take 1 tablet by mouth 3 (Three) Times a Day As Needed., Disp: , Rfl:     tamsulosin (FLOMAX) 0.4 MG capsule 24 hr capsule, Take 1 capsule by mouth 2 (Two) Times a Day., Disp: , Rfl:     meloxicam (MOBIC) 15 MG tablet, 1 PO Daily with food., Disp: 30 tablet, Rfl: 3    Past Surgical History:   Procedure Laterality Date    APPENDECTOMY  05/31/1974    Acute appendicitis    BACK SURGERY      Low back disk surgery (1)     COLONOSCOPY N/A 7/19/2018    Procedure: COLONOSCOPY;  Surgeon: Serjio Mckeon DO;  Location: [facility] ENDOSCOPY;  Service: Gastroenterology    ENDOSCOPY AND COLONOSCOPY  09/10/2013    Diverticulosis in sigmoid colon. 1 polyp in colon, 50 cm from entry; removed by snare cautery polypectomy. 1 polyp in cecum; removed by cold biopsy polypectomy. Hemorrhoids found.    EXCISION LESION  08/09/2012    Destruction of Benign Lesion (1-14) 07646 (1)      INJECTION OF MEDICATION  05/17/2012    Kenalog (1)      OTHER SURGICAL HISTORY  01/31/2003    Revision of penis (1) : Phalloplasty.Phimosis    TONSILLECTOMY      age 26       Family History   Problem Relation Age of Onset    Heart disease Mother     Coronary artery disease Other     Osteoarthritis Other        Social History     Socioeconomic History    Marital status: Single   Tobacco Use    Smoking status: Former    Smokeless tobacco: Never    Tobacco comments:     Patient quit smoking years ago; he smoked for up to 2 packs a day for many years   Substance and Sexual Activity    Alcohol use: Yes     Comment: SOCIAL    Drug use: No    Sexual activity: Defer        Review of Systems   Constitutional: Negative.    HENT: Negative.     Eyes: Negative.    Respiratory:  Positive for shortness of breath.   "  Cardiovascular: Negative.    Gastrointestinal: Negative.    Endocrine: Negative.    Genitourinary: Negative.    Musculoskeletal:  Positive for arthralgias and joint swelling.        Stiffness   Skin: Negative.    Allergic/Immunologic: Negative.    Neurological:  Positive for dizziness.   Hematological: Negative.    Psychiatric/Behavioral:  Positive for sleep disturbance.      PHYSICAL EXAMINATION:       Ht 180.3 cm (71\")   Wt 110 kg (241 lb 9.6 oz)   BMI 33.70 kg/m²     Physical Exam  Constitutional:       General: He is not in acute distress.     Appearance: Normal appearance.   Pulmonary:      Effort: Pulmonary effort is normal. No respiratory distress.   Neurological:      Mental Status: He is alert and oriented to person, place, and time.       GAIT:     []  Normal  [x]  Antalgic    Assistive device: [x]  None  []  Walker     []  Crutches  []  Cane     []  Wheelchair []  Stretcher    Right Knee Exam     Comments:  Moderate effusion.  Diffuse tenderness medial and lateral aspect.  Stable exam.  Good distal pulses and sensation.  Some crepitus with motion.                XR Knee 1 or 2 View Right    Result Date: 9/12/2023  Narrative: Ordering Provider:  Emil Park MD Ordering Diagnosis/Indication:  Right knee pain, unspecified chronicity Procedure:  XR KNEE 1 OR 2 VW RIGHT Exam Date:  9/12/23 COMPARISON:  Not applicable, no relevant images available.     Impression:  AP bilateral standing of the knees with lateral of the right knee show moderate arthritic change in the right knee with essentially 90% joint space narrowing along the far medial aspect of the medial compartment.  Mild arthritic changes also noted in the left knee.  No acute bony abnormality. Emil Park MD 9/12/23         ASSESSMENT:    Diagnoses and all orders for this visit:    Primary osteoarthritis of right knee  -     Large Joint Arthrocentesis: R knee    Acute pain of right knee  -     Large Joint Arthrocentesis: R " knee    Essential hypertension    Other orders  -     memantine (NAMENDA XR) 14 MG capsule sustained-release 24 hr extended release capsule; Take 2 capsules every day by oral route.  -     oxybutynin XL (DITROPAN-XL) 5 MG 24 hr tablet; Take 1 tablet by mouth 3 (Three) Times a Day As Needed.  -     furosemide (LASIX) 20 MG tablet; Take 1 tablet every day by oral route.  -     erythromycin (ROMYCIN) 5 MG/GM ophthalmic ointment; APPLY A THIN LAYER INTO BOTH EYES AT BEDTIME  -     multivitamin with minerals (ONE-A-DAY MENS 50+ PO); Take 1 tablet by mouth Daily.  -     meloxicam (MOBIC) 15 MG tablet; 1 PO Daily with food.          PLAN    Moderate OA in knee with moderate effusion.  Discussed aspiration and injection.  Began discussion for eventual TKA if symptoms worsen or fail to improve.  Has been on nabumetone that has not been very effective. Will switch to meloxicam.  Discussed possible MRI depending on change in symptoms.  F/u in 6 weeks for further evaluation.    Large Joint Arthrocentesis: R knee  Date/Time: 9/12/2023 4:27 PM  Consent given by: patient  Site marked: site marked  Timeout: Immediately prior to procedure a time out was called to verify the correct patient, procedure, equipment, support staff and site/side marked as required   Supporting Documentation  Indications: pain   Procedure Details  Location: knee - R knee  Preparation: Patient was prepped and draped in the usual sterile fashion  Needle size: 22 G  Approach: anteromedial  Medications administered: 40 mg triamcinolone acetonide 40 MG/ML; 7 mL lidocaine 1 %  Aspirate amount: 35 mL  Aspirate: clear and yellow  Patient tolerance: patient tolerated the procedure well with no immediate complications         Return in about 6 weeks (around 10/24/2023) for recheck.    Emil Park MD

## 2023-09-15 RX ORDER — LIDOCAINE HYDROCHLORIDE 10 MG/ML
7 INJECTION, SOLUTION INFILTRATION; PERINEURAL
Status: COMPLETED | OUTPATIENT
Start: 2023-09-12 | End: 2023-09-12

## 2023-09-15 RX ORDER — TRIAMCINOLONE ACETONIDE 40 MG/ML
40 INJECTION, SUSPENSION INTRA-ARTICULAR; INTRAMUSCULAR
Status: COMPLETED | OUTPATIENT
Start: 2023-09-12 | End: 2023-09-12

## (undated) DEVICE — SINGLE-USE BIOPSY FORCEPS: Brand: RADIAL JAW 4

## (undated) DEVICE — CANN SMPL SOFTECH BIFLO ETCO2 A/M 7FT